# Patient Record
Sex: FEMALE | Race: WHITE | NOT HISPANIC OR LATINO | Employment: OTHER | URBAN - METROPOLITAN AREA
[De-identification: names, ages, dates, MRNs, and addresses within clinical notes are randomized per-mention and may not be internally consistent; named-entity substitution may affect disease eponyms.]

---

## 2023-03-15 ENCOUNTER — HOSPITAL ENCOUNTER (OUTPATIENT)
Dept: RADIATION ONCOLOGY | Facility: HOSPITAL | Age: 83
Setting detail: RADIATION/ONCOLOGY SERIES
Discharge: HOME | End: 2023-03-15
Payer: MEDICARE

## 2023-03-15 ENCOUNTER — OFFICE VISIT (OUTPATIENT)
Dept: RADIATION ONCOLOGY | Facility: HOSPITAL | Age: 83
End: 2023-03-15
Payer: MEDICARE

## 2023-03-15 VITALS
DIASTOLIC BLOOD PRESSURE: 65 MMHG | BODY MASS INDEX: 14.85 KG/M2 | TEMPERATURE: 98.6 F | RESPIRATION RATE: 18 BRPM | HEART RATE: 72 BPM | HEIGHT: 64 IN | SYSTOLIC BLOOD PRESSURE: 136 MMHG | OXYGEN SATURATION: 93 % | WEIGHT: 87 LBS

## 2023-03-15 DIAGNOSIS — C44.91 NODULAR BASAL CELL CARCINOMA: Primary | ICD-10-CM

## 2023-03-15 PROCEDURE — G0463 HOSPITAL OUTPT CLINIC VISIT: HCPCS | Performed by: RADIOLOGY

## 2023-03-15 RX ORDER — LISINOPRIL 20 MG/1
1 TABLET ORAL DAILY
COMMUNITY
Start: 2023-02-18

## 2023-03-15 RX ORDER — ALBUTEROL SULFATE 90 UG/1
2 AEROSOL, METERED RESPIRATORY (INHALATION) EVERY 4 HOURS PRN
COMMUNITY
Start: 2023-03-09

## 2023-03-15 RX ORDER — METOPROLOL SUCCINATE 100 MG/1
TABLET, EXTENDED RELEASE ORAL
COMMUNITY
Start: 2023-02-02

## 2023-03-15 RX ORDER — AMLODIPINE BESYLATE 5 MG/1
1 TABLET ORAL DAILY
COMMUNITY
Start: 2022-12-26

## 2023-03-15 RX ORDER — TIOTROPIUM BROMIDE INHALATION SPRAY 3.12 UG/1
SPRAY, METERED RESPIRATORY (INHALATION)
COMMUNITY
Start: 2023-01-16

## 2023-03-15 RX ORDER — BENZONATATE 200 MG/1
CAPSULE ORAL
COMMUNITY
Start: 2023-01-09

## 2023-03-15 RX ORDER — FLUTICASONE PROPIONATE 50 MCG
SPRAY, SUSPENSION (ML) NASAL
COMMUNITY
Start: 2023-01-09

## 2023-03-15 RX ORDER — DOXYCYCLINE HYCLATE 50 MG/1
1 CAPSULE, GELATIN COATED ORAL
COMMUNITY
Start: 2023-02-18

## 2023-03-15 RX ORDER — LANSOPRAZOLE 15 MG/1
15 CAPSULE, DELAYED RELEASE ORAL
COMMUNITY
Start: 2023-01-10

## 2023-03-15 RX ORDER — ROSUVASTATIN CALCIUM 20 MG/1
1 TABLET, COATED ORAL DAILY
COMMUNITY
Start: 2023-02-16

## 2023-03-15 RX ORDER — APIXABAN 2.5 MG/1
1 TABLET, FILM COATED ORAL EVERY 12 HOURS SCHEDULED
COMMUNITY
Start: 2022-12-19

## 2023-03-15 RX ORDER — FLUTICASONE PROPIONATE AND SALMETEROL 250; 50 UG/1; UG/1
POWDER RESPIRATORY (INHALATION)
COMMUNITY
Start: 2023-01-09

## 2023-03-15 NOTE — PROGRESS NOTES
CONSULTATION NOTE      :                                                          1940  DATE OF CONSULTATION:                       3/15/2023   REQUESTING PHYSICIAN:                   DELGADO Jimenez  REASON FOR CONSULTATION:           Basal cell carcinoma of the left cheek and right nasal ala   Cancer Staging   cT1, cN0, cM0    Thank you for requesting my services in evaluation of this pleasant individual.  I am seeing them in outpatient consultation regarding a diagnosis of basal cell carcinoma of the left cheek and right nasal ala.     BRIEF HISTORY:  The patient is a very pleasant 82 y.o. female  with multiple medical comorbidities including COPD and 66-pack-year smoking, who recently presented with 2 separate skin lesions on her face.  The first being a circular area with ulceration just below her left eye, and the second being a small lesion on the right nasal ala.  She was seen at [facility] and underwent shave biopsies that confirmed both of them to be a nodular basal cell carcinoma.  She is not considered an ideal surgical candidate, and is being referred now for consideration of nonsurgical treatment.  She denies any other symptoms of pain, burning, or bleeding.    Allergy:   Allergies   Allergen Reactions   • Atorvastatin Cough     Muscle pain       Social History:   Social History     Socioeconomic History   • Marital status:    Tobacco Use   • Smoking status: Every Day     Packs/day: 1.00     Years: 66.00     Pack years: 66.00     Types: Cigarettes   • Smokeless tobacco: Never   Substance and Sexual Activity   • Alcohol use: Not Currently   • Drug use: Never   • Sexual activity: Defer       Past Medical History:   Past Medical History:   Diagnosis Date   • A-fib ([facility])    • Arthritis    • COPD (chronic obstructive pulmonary disease) ([facility])    • GERD (gastroesophageal reflux disease)    • History of tuberculosis     as teenage   • Hypertension    • Oxygen dependent    • Skin  "cancer        Family History: family history includes Breast cancer in her sister; Multiple myeloma in her mother; Prostate cancer in her father.     Surgical History:   Past Surgical History:   Procedure Laterality Date   • CHOLECYSTECTOMY     • COLON SURGERY     • FEMUR SURGERY Left     3 pins   • HYSTERECTOMY          Review of Systems:   Review of Systems   Constitutional: Positive for fatigue.   Respiratory: Positive for shortness of breath (with exertion).         Afib  O2 2.5 L N/C continuous   Musculoskeletal: Positive for arthralgias.   All other systems reviewed and are negative.          Objective   VITAL SIGNS:   Vitals:    03/15/23 1424   BP: 136/65   Pulse: 72   Resp: 18   Temp: 98.6 °F (37 °C)   TempSrc: Temporal   SpO2: 93%  Comment: 3L O2   Weight: 39.5 kg (87 lb)   Height: 162.6 cm (64\")        Karnofsky score: 80       Physical Exam:   Physical Exam  Vitals and nursing note reviewed.   Constitutional:       General: She is not in acute distress.     Appearance: She is well-developed.   HENT:      Head: Normocephalic and atraumatic.      Nose:      Comments: 5 mm circular and erythematous lesion on the right nasal ala consistent with basal cell carcinoma.  See image below.  There is a separate 7 x 8 mm, ulcerated lesion just inferior and lateral to the lateral canthus of the left eye consistent with basal cell carcinoma.  The lesion is freely mobile.  There are no palpable facial adenopathy.  Eyes:      Conjunctiva/sclera: Conjunctivae normal.      Pupils: Pupils are equal, round, and reactive to light.   Neck:      Comments: No palpable cervical or supraclavicular adenopathy.  Cardiovascular:      Rate and Rhythm: Normal rate and regular rhythm.      Heart sounds: No murmur heard.    No friction rub.   Pulmonary:      Effort: Pulmonary effort is normal.      Breath sounds: Normal breath sounds. No wheezing.   Abdominal:      General: Bowel sounds are normal. There is no distension.      " Palpations: Abdomen is soft. There is no mass.      Tenderness: There is no abdominal tenderness.   Musculoskeletal:         General: Normal range of motion.      Cervical back: Normal range of motion and neck supple.   Lymphadenopathy:      Cervical: No cervical adenopathy.   Skin:     General: Skin is warm and dry.   Neurological:      Mental Status: She is alert and oriented to person, place, and time.   Psychiatric:         Behavior: Behavior normal.         Thought Content: Thought content normal.         Judgment: Judgment normal.            PATHOLOGY  Shave biopsy: Left malar cheek 2/17/2023:  Nodular and micronodular basal cell carcinoma, ulcerated.  The lesion involves the peripheral and deep margin.  Shave biopsy: Right nasal ala: 2/17/2023:  Nodular basal cell carcinoma, with the lesion involving the peripheral and deep margin       The following portions of the patient's history were reviewed and updated as appropriate: allergies, current medications, past family history, past medical history, past social history, past surgical history and problem list.    Assessment:   Assessment  Mrs. Hunt is an 82-year-old female with multiple medical comorbidities including COPD who presents now with 2 basal cell carcinomas of the face.  She is not an ideal candidate for surgical resection, therefore I am seeing her today for consideration of definitive radiation.  I believe both of these sites are excellent candidates for superficial brachytherapy, and I think we can likely treat him to a dose of 30 Gray in 5 fractions.  She is dependent on her son for transportation, and they expressed some difficulty today with her ability to come back and forth for frequent treatments.  We will therefore tentatively schedule her treatments to begin the next 3 to 4 weeks and we will aim to treat her once per week for total of 5 weeks of the week and schedule them to the family's availability.  They were very pleased with this  treatment option and signed informed consent.  We will be in contact with them to schedule her treatments once they have been able to discuss amongst the family and decide on the most optimal times based on their schedule.  She will return initially for set up session, and then the 5 individual treatments    RECOMMENDATIONS:    Plan for brachytherapy to the right nasal ala and the left malar cheek, 30 Gray in 5 fractions    I spent a total of 45 minutes on todays visit, with more than 30 minutes in direct face to face communication, and the remainder of the time spent in reviewing the relevant history, records, available imaging, and for documentation.    Follow Up:   Return in about 4 weeks (around 4/12/2023) for Brachytherapy Simulation.  Diagnoses and all orders for this visit:    1. Nodular basal cell carcinoma (Primary)  -     Ambulatory Referral to ONC Social Work    Other orders  -     [facility]    Thank you for allowing me to participate in the care of this individual.    Sincerely,       Alpesh Medina MD

## 2023-03-16 ENCOUNTER — TELEPHONE (OUTPATIENT)
Dept: ONCOLOGY | Facility: CLINIC | Age: 83
End: 2023-03-16
Payer: MEDICARE

## 2023-03-16 NOTE — TELEPHONE ENCOUNTER
Distress Screening Follow-up    Name: Corina Hunt    : 1940    Diagnosis: Nodular basal cell carcinoma    Location of Distress Screening: Radiation Oncology    Distress Level: 4 (3/15/2023  2:00 PM)      Comments:     SW called to follow up with pt regarding recent distress screen results. SW left a VM with pt introducing self, and offering additional support. SW provided call back number, ([phone]) and encouraged pt to reach out at her convenience.     SW will remain available to offer support.     Damaris Gaston, MSW  Oncology Social Worker

## 2023-04-19 ENCOUNTER — HOSPITAL ENCOUNTER (OUTPATIENT)
Dept: RADIATION ONCOLOGY | Facility: HOSPITAL | Age: 83
Discharge: HOME | End: 2023-04-19

## 2023-04-19 ENCOUNTER — HOSPITAL ENCOUNTER (OUTPATIENT)
Dept: RADIATION ONCOLOGY | Facility: HOSPITAL | Age: 83
Setting detail: RADIATION/ONCOLOGY SERIES
Discharge: HOME | End: 2023-04-19
Payer: MEDICARE

## 2023-04-19 ENCOUNTER — PROCEDURE VISIT (OUTPATIENT)
Dept: RADIATION ONCOLOGY | Facility: HOSPITAL | Age: 83
End: 2023-04-19
Payer: MEDICARE

## 2023-04-19 DIAGNOSIS — C44.91 NODULAR BASAL CELL CARCINOMA: Primary | ICD-10-CM

## 2023-04-19 PROCEDURE — G0463 HOSPITAL OUTPT CLINIC VISIT: HCPCS

## 2023-04-19 PROCEDURE — 77290 THER RAD SIMULAJ FIELD CPLX: CPT | Performed by: RADIOLOGY

## 2023-04-19 PROCEDURE — 77470 SPECIAL RADIATION TREATMENT: CPT | Performed by: RADIOLOGY

## 2023-04-19 PROCEDURE — 77334 RADIATION TREATMENT AID(S): CPT | Performed by: RADIOLOGY

## 2023-04-20 PROCEDURE — 77316 BRACHYTX ISODOSE PLAN SIMPLE: CPT | Performed by: RADIOLOGY

## 2023-04-20 NOTE — PROGRESS NOTES
RADIATION ONCOLOGY RE-EVALUATION NOTE    :                                                            1940  DATE OF RE-EVALUATION:                       2023   REQUESTING PHYSICIAN:                    DELGADO Jimenez    BRIEF HISTORY: The patient is a very pleasant 82 y.o. female  with multiple medical comorbidities including COPD and 66-pack-year smoking, who recently presented with 2 separate skin lesions on her face.  The first being a circular area with ulceration just below her left eye, and the second being a small lesion on the right nasal ala.  She is not considered an ideal surgical candidate, and is being referred now for consideration of nonsurgical treatment.  She denies any other symptoms of pain, burning, or bleeding.  I last saw her 1 month ago when we discussed options and she was most interested in pursuing brachytherapy, but wanted to wait before she started treatments.  She returns today for reevaluation and brachytherapy planning.    Allergy:         Allergies   Allergen Reactions   • Atorvastatin Cough       Muscle pain         Social History:   Social History   Social History            Socioeconomic History   • Marital status:    Tobacco Use   • Smoking status: Every Day       Packs/day: 1.00       Years: 66.00       Pack years: 66.00       Types: Cigarettes   • Smokeless tobacco: Never   Substance and Sexual Activity   • Alcohol use: Not Currently   • Drug use: Never   • Sexual activity: Defer            Past Medical History:   Medical History        Past Medical History:   Diagnosis Date   • A-fib ([facility])     • Arthritis     • COPD (chronic obstructive pulmonary disease) ([facility])     • GERD (gastroesophageal reflux disease)     • History of tuberculosis       as teenage   • Hypertension     • Oxygen dependent     • Skin cancer              Family History: family history includes Breast cancer in her sister; Multiple myeloma in her mother; Prostate cancer in her father.       Surgical History:   Surgical History         Past Surgical History:   Procedure Laterality Date   • CHOLECYSTECTOMY       • COLON SURGERY       • FEMUR SURGERY Left       3 pins   • HYSTERECTOMY                Review of Systems:   Review of Systems   Constitutional: Positive for fatigue.   Respiratory: Positive for shortness of breath (with exertion).         Afib  O2 2.5 L N/C continuous   Musculoskeletal: Positive for arthralgias.   All other systems reviewed and are negative.     Physical Exam  Vitals and nursing note reviewed.   Constitutional:       General: She is not in acute distress.     Appearance: She is well-developed.   HENT:      Head: Normocephalic and atraumatic.      Comments: 5x7mm Ulcerative lesion on the left maxillary cheek at the zygoma, and small punctate 5mm lesion on the right nasal ala without ulceration  Eyes:      Conjunctiva/sclera: Conjunctivae normal.      Pupils: Pupils are equal, round, and reactive to light.   Cardiovascular:      Rate and Rhythm: Normal rate and regular rhythm.      Heart sounds: No murmur heard.    No friction rub.   Pulmonary:      Effort: Pulmonary effort is normal.      Breath sounds: Normal breath sounds. No wheezing.   Abdominal:      General: Bowel sounds are normal. There is no distension.      Palpations: Abdomen is soft. There is no mass.      Tenderness: There is no abdominal tenderness.   Musculoskeletal:         General: Normal range of motion.      Cervical back: Normal range of motion and neck supple.   Lymphadenopathy:      Cervical: No cervical adenopathy.   Skin:     General: Skin is warm and dry.   Neurological:      Mental Status: She is alert and oriented to person, place, and time.   Psychiatric:         Behavior: Behavior normal.         Thought Content: Thought content normal.         Judgment: Judgment normal.     ASSESSMENT/PLAN:  Ms. Hunt is an 82-year-old female with invasive basal cell carcinoma of the right nasal ala in the left  maxillary cheek.  She prefers nonsurgical therapy.  She is a very good candidate for brachytherapy and I plan to treat her to a dose of 30 Gray in 5 fractions that we will deliver on an every other day basis.  Consent had already been signed so we proceeded with a brachytherapy planning session today.  I anticipate that she will begin treatments in the next 1 to 2 weeks pending insurance authorization.    I spent a total of 45 minutes on today's visit, with over 30 minutes in direct face-to-face communication with the patient.

## 2023-04-24 ENCOUNTER — HOSPITAL ENCOUNTER (OUTPATIENT)
Dept: RADIATION ONCOLOGY | Facility: HOSPITAL | Age: 83
Discharge: HOME | End: 2023-04-24

## 2023-04-24 ENCOUNTER — PROCEDURE VISIT (OUTPATIENT)
Dept: RADIATION ONCOLOGY | Facility: HOSPITAL | Age: 83
End: 2023-04-24
Payer: MEDICARE

## 2023-04-24 DIAGNOSIS — J44.9 CHRONIC OBSTRUCTIVE PULMONARY DISEASE, UNSPECIFIED COPD TYPE: Primary | ICD-10-CM

## 2023-04-24 PROCEDURE — C1717 BRACHYTX, NON-STR,HDR IR-192: HCPCS | Performed by: RADIOLOGY

## 2023-04-24 PROCEDURE — 77280 THER RAD SIMULAJ FIELD SMPL: CPT | Performed by: RADIOLOGY

## 2023-04-24 PROCEDURE — 77768 HDR RDNCL SKN SURF BRACHYTX: CPT | Performed by: RADIOLOGY

## 2023-04-24 RX ORDER — ALBUTEROL SULFATE 90 UG/1
2 AEROSOL, METERED RESPIRATORY (INHALATION)
Qty: 1 G | Refills: 2 | Status: CANCELLED | OUTPATIENT
Start: 2023-04-24

## 2023-04-24 RX ORDER — ALBUTEROL SULFATE 90 UG/1
2 AEROSOL, METERED RESPIRATORY (INHALATION) EVERY 4 HOURS PRN
Qty: 18 G | Refills: 2 | Status: SHIPPED | OUTPATIENT
Start: 2023-04-24

## 2023-04-26 ENCOUNTER — PROCEDURE VISIT (OUTPATIENT)
Dept: RADIATION ONCOLOGY | Facility: HOSPITAL | Age: 83
End: 2023-04-26
Payer: MEDICARE

## 2023-04-26 ENCOUNTER — HOSPITAL ENCOUNTER (OUTPATIENT)
Dept: RADIATION ONCOLOGY | Facility: HOSPITAL | Age: 83
Discharge: HOME | End: 2023-04-26

## 2023-04-26 DIAGNOSIS — C44.91 NODULAR BASAL CELL CARCINOMA: Primary | ICD-10-CM

## 2023-04-26 PROCEDURE — 77280 THER RAD SIMULAJ FIELD SMPL: CPT | Performed by: RADIOLOGY

## 2023-04-26 PROCEDURE — 77768 HDR RDNCL SKN SURF BRACHYTX: CPT | Performed by: RADIOLOGY

## 2023-04-26 PROCEDURE — C1717 BRACHYTX, NON-STR,HDR IR-192: HCPCS | Performed by: RADIOLOGY

## 2023-04-26 NOTE — PROGRESS NOTES
04/26/2023  Corina Hunt    Procedure: Skin Brachytherapy  Diagnosis: Non-melanoma skin cancer    HDR#   2 out of a planned total of 5 treatments.    Patient presents for skin brachytherapy to 2 separate areas - the left cheek and the right nasal ala..  They have no complaints.  The patient was positioned in the supine position on the treatment table.  A simple simulation was performed today confirming the correct size, position, and placement of accessory devices to hold the skin applicator.      Left Maxillary Cheek  The 2 cm Rivera applicator was placed using the treatment cap and centered over the lesion on the leftt cheek.  Our Medical Physicist performed pretreatment surveys, calibrated the treatment plan based on today's dose rate, and confirmed correct placement and connection of the applicator to the HDR afterloader.  A dose of 6 Gy was prescribed to a depth of 3mm.       Right Nasal Ala  The 2 cm Rivera applicator was placed using the treatment cap and centered over the lesion on the right nasal ala.  Our Medical Physicist performed pretreatment surveys, calibrated the treatment plan based on today's dose rate, and confirmed correct placement and connection of the applicator to the HDR afterloader.  A dose of 6 Gy was prescribed to a depth of 3mm.      The Medical Physicist and the Radiation Oncologist remained present at the console for the entire duration of brachytherapy administration.  There were no immediate events, and the patient tolerated the procedure well with no complications.  Physics survey was negative with no residual radioactivity.  The patient was then discharged to home in good condition.  Patient will return for their next treatment.

## 2023-05-01 ENCOUNTER — HOSPITAL ENCOUNTER (OUTPATIENT)
Dept: RADIATION ONCOLOGY | Facility: HOSPITAL | Age: 83
Setting detail: RADIATION/ONCOLOGY SERIES
Discharge: HOME | End: 2023-05-01
Payer: MEDICARE

## 2023-05-01 ENCOUNTER — PROCEDURE VISIT (OUTPATIENT)
Dept: RADIATION ONCOLOGY | Facility: HOSPITAL | Age: 83
End: 2023-05-01
Payer: MEDICARE

## 2023-05-01 DIAGNOSIS — C44.91 NODULAR BASAL CELL CARCINOMA: Primary | ICD-10-CM

## 2023-05-01 PROCEDURE — C1717 BRACHYTX, NON-STR,HDR IR-192: HCPCS | Performed by: RADIOLOGY

## 2023-05-01 PROCEDURE — 77280 THER RAD SIMULAJ FIELD SMPL: CPT | Performed by: RADIOLOGY

## 2023-05-01 PROCEDURE — 77768 HDR RDNCL SKN SURF BRACHYTX: CPT | Performed by: RADIOLOGY

## 2023-05-01 NOTE — PROGRESS NOTES
05/1/2023  Corina Hunt     Procedure: Skin Brachytherapy  Diagnosis: Non-melanoma skin cancer     HDR#   3 out of a planned total of 5 treatments.     Patient presents for skin brachytherapy to 2 separate areas - the left cheek and the right nasal ala..  They have no complaints.  The patient was positioned in the supine position on the treatment table.  A simple simulation was performed today confirming the correct size, position, and placement of accessory devices to hold the skin applicator.       Left Maxillary Cheek  The 2 cm Rivera applicator was placed using the treatment cap and centered over the lesion on the leftt cheek.  Our Medical Physicist performed pretreatment surveys, calibrated the treatment plan based on today's dose rate, and confirmed correct placement and connection of the applicator to the HDR afterloader.  A dose of 6 Gy was prescribed to a depth of 3mm.       Right Nasal Ala  The 2 cm Rivera applicator was placed using the treatment cap and centered over the lesion on the right nasal ala.  Our Medical Physicist performed pretreatment surveys, calibrated the treatment plan based on today's dose rate, and confirmed correct placement and connection of the applicator to the HDR afterloader.  A dose of 6 Gy was prescribed to a depth of 3mm.      The Medical Physicist and the Radiation Oncologist remained present at the console for the entire duration of brachytherapy administration.  There were no immediate events, and the patient tolerated the procedure well with no complications.  Physics survey was negative with no residual radioactivity.  The patient was then discharged to home in good condition.  Patient will return for their next treatment.

## 2023-05-03 ENCOUNTER — HOSPITAL ENCOUNTER (OUTPATIENT)
Dept: RADIATION ONCOLOGY | Facility: HOSPITAL | Age: 83
Setting detail: RADIATION/ONCOLOGY SERIES
Discharge: HOME | End: 2023-05-03
Payer: MEDICARE

## 2023-05-03 ENCOUNTER — PROCEDURE VISIT (OUTPATIENT)
Dept: RADIATION ONCOLOGY | Facility: HOSPITAL | Age: 83
End: 2023-05-03
Payer: MEDICARE

## 2023-05-03 DIAGNOSIS — C44.91 NODULAR BASAL CELL CARCINOMA: Primary | ICD-10-CM

## 2023-05-03 PROCEDURE — 77768 HDR RDNCL SKN SURF BRACHYTX: CPT | Performed by: RADIOLOGY

## 2023-05-03 PROCEDURE — C1717 BRACHYTX, NON-STR,HDR IR-192: HCPCS | Performed by: RADIOLOGY

## 2023-05-03 PROCEDURE — 77280 THER RAD SIMULAJ FIELD SMPL: CPT | Performed by: RADIOLOGY

## 2023-05-03 NOTE — PROGRESS NOTES
05/3/2023  Corina Ferrisley     Procedure: Skin Brachytherapy  Diagnosis: Non-melanoma skin cancer     HDR#   4 out of a planned total of 5 treatments.     Patient presents for skin brachytherapy to 2 separate areas - the left cheek and the right nasal ala..  They have no complaints.  The patient was positioned in the supine position on the treatment table.  A simple simulation was performed today confirming the correct size, position, and placement of accessory devices to hold the skin applicator.       Left Maxillary Cheek  The 2 cm Rivera applicator was placed using the treatment cap and centered over the lesion on the leftt cheek.  Our Medical Physicist performed pretreatment surveys, calibrated the treatment plan based on today's dose rate, and confirmed correct placement and connection of the applicator to the HDR afterloader.  A dose of 6 Gy was prescribed to a depth of 3mm.       Right Nasal Ala  The 2 cm Rivera applicator was placed using the treatment cap and centered over the lesion on the right nasal ala.  Our Medical Physicist performed pretreatment surveys, calibrated the treatment plan based on today's dose rate, and confirmed correct placement and connection of the applicator to the HDR afterloader.  A dose of 6 Gy was prescribed to a depth of 3mm.      The Medical Physicist and the Radiation Oncologist remained present at the console for the entire duration of brachytherapy administration.  There were no immediate events, and the patient tolerated the procedure well with no complications.  Physics survey was negative with no residual radioactivity.  The patient was then discharged to home in good condition.  Patient will return for their next treatment

## 2023-05-08 ENCOUNTER — APPOINTMENT (OUTPATIENT)
Dept: RADIATION ONCOLOGY | Facility: HOSPITAL | Age: 83
End: 2023-05-08
Payer: MEDICARE

## 2023-05-08 ENCOUNTER — HOSPITAL ENCOUNTER (OUTPATIENT)
Dept: RADIATION ONCOLOGY | Facility: HOSPITAL | Age: 83
Setting detail: RADIATION/ONCOLOGY SERIES
Discharge: HOME | End: 2023-05-08
Payer: MEDICARE

## 2023-05-08 DIAGNOSIS — C44.91 NODULAR BASAL CELL CARCINOMA: Primary | ICD-10-CM

## 2023-05-08 PROCEDURE — C1717 BRACHYTX, NON-STR,HDR IR-192: HCPCS | Performed by: RADIOLOGY

## 2023-05-08 PROCEDURE — 77768 HDR RDNCL SKN SURF BRACHYTX: CPT | Performed by: RADIOLOGY

## 2023-05-08 PROCEDURE — 77336 RADIATION PHYSICS CONSULT: CPT | Performed by: RADIOLOGY

## 2023-05-08 PROCEDURE — 77280 THER RAD SIMULAJ FIELD SMPL: CPT | Performed by: RADIOLOGY

## 2023-05-18 NOTE — RADIATION COMPLETION NOTES
DATE OF COMPLETION: 5/8/2023  DIAGNOSIS: Cutaneous basal cell carcinomas of the face   Cancer Staging   cT1, cN0, cM0    REFERRING: Dajuan Mensah MD        Corina Vera completed radiation therapy today.      BACKGROUND: Corina Hunt is an 82-year-old female with multiple cutaneous basal cell carcinomas.  She desires nonsurgical therapy and has an aggressive lesion on the right nasal ala and the left maxillary cheek.  She underwent a course of superficial brachytherapy and completed treatments as detailed below:    Treatment Summary     Dates of Therapy: 4/20/2023-5/8/2020  Treatment Site: Left maxillary cheek  Dose: 30 Gy in 5 fractions of 6 Gy each  Technique: Treatments were delivered using a 2 cm Rivera applicator using iridium 192 sources with calculations designed to deliver the prescription dose to a depth of 3 mm.    Dates of Therapy: 4/20/2023-5/8/2020  Treatment Site: Right nasal ala  Dose: 30 Gy in 5 fractions of 6 Gy each  Technique: Treatments were delivered using a 2 cm Rivera applicator using iridium 192 sources with calculations designed to deliver the prescription dose to a depth of 3 mm.    Treatment Course and Tolerance: She tolerated radiation treatments very well.  She developed only mild cutaneous skin changes consistent with grade 1 erythema.    The initial follow up visit will be in 1 month.    Corina Hunt knows to call if any problems or concerns develop in the meantime.     Electronically signed by: Alpesh Medina MD                    Cc: Ambar Oakley MD

## 2023-06-14 ENCOUNTER — OFFICE VISIT (OUTPATIENT)
Dept: RADIATION ONCOLOGY | Facility: HOSPITAL | Age: 83
End: 2023-06-14
Payer: MEDICARE

## 2023-06-14 VITALS
SYSTOLIC BLOOD PRESSURE: 117 MMHG | WEIGHT: 83 LBS | BODY MASS INDEX: 14.25 KG/M2 | OXYGEN SATURATION: 92 % | TEMPERATURE: 97.3 F | RESPIRATION RATE: 18 BRPM | HEART RATE: 77 BPM | DIASTOLIC BLOOD PRESSURE: 61 MMHG

## 2023-06-14 DIAGNOSIS — C44.91 NODULAR BASAL CELL CARCINOMA: Primary | ICD-10-CM

## 2023-06-14 PROCEDURE — G0463 HOSPITAL OUTPT CLINIC VISIT: HCPCS

## 2023-06-14 RX ORDER — BUDESONIDE, GLYCOPYRROLATE, AND FORMOTEROL FUMARATE 160; 9; 4.8 UG/1; UG/1; UG/1
2 AEROSOL, METERED RESPIRATORY (INHALATION) 2 TIMES DAILY
COMMUNITY

## 2023-06-14 NOTE — PROGRESS NOTES
FOLLOW UP NOTE    PATIENT:                                                      Corina Hunt  MEDICAL RECORD #:                        [id number]  :                                                          1940  COMPLETION DATE:    2023  DIAGNOSIS:     Nodular basal cell carcinoma  - cT1, cN0, cM0    BRIEF HISTORY:  Mrs. Hunt returns to clinic today for a scheduled follow-up visit after completing brachytherapy along to 2 separate cutaneous basal cell carcinomas on her face, including the left lateral orbit and the right nasal ala. She is very pleased.  She has tolerated radiation well and does not have any ongoing symptoms.  Both lesions seem to have resolved per her report.    MEDICATIONS: Medication reconciliation for the patient was reviewed and confirmed in the electronic medical record.    Review of Systems   Respiratory:  Positive for shortness of breath.         3L O2 continous   All other systems reviewed and are negative.    Karnofsky score: 80     Physical Exam  Vitals and nursing note reviewed.   Constitutional:       General: She is not in acute distress.     Appearance: She is well-developed.   HENT:      Head: Normocephalic and atraumatic.      Comments: Minimal postradiation changes over the right nasal ala and the left lateral orbit.  There is only mild erythema consistent with treatment response and no evidence of ulceration or active disease.  Eyes:      Conjunctiva/sclera: Conjunctivae normal.      Pupils: Pupils are equal, round, and reactive to light.   Cardiovascular:      Rate and Rhythm: Normal rate and regular rhythm.      Heart sounds: No murmur heard.    No friction rub.   Pulmonary:      Effort: Pulmonary effort is normal.      Breath sounds: Normal breath sounds. No wheezing.   Abdominal:      General: Bowel sounds are normal. There is no distension.      Palpations: Abdomen is soft. There is no mass.      Tenderness: There is no abdominal tenderness.    Musculoskeletal:         General: Normal range of motion.      Cervical back: Normal range of motion and neck supple.   Lymphadenopathy:      Cervical: No cervical adenopathy.   Skin:     General: Skin is warm and dry.   Neurological:      Mental Status: She is alert and oriented to person, place, and time.   Psychiatric:         Behavior: Behavior normal.         Thought Content: Thought content normal.         Judgment: Judgment normal.         VITAL SIGNS:   Vitals:    06/14/23 1539   BP: 117/61   Pulse: 77   Resp: 18   Temp: 97.3 °F (36.3 °C)   TempSrc: Temporal   SpO2: 92%  Comment: 3L O2   Weight: 37.6 kg (83 lb)             Karnofsky score: 80         The following portions of the patient's history were reviewed and updated as appropriate: allergies, current medications, past family history, past medical history, past social history, past surgical history and problem list.         Diagnoses and all orders for this visit:    1. Nodular basal cell carcinoma (Primary)         IMPRESSION: Mrs. Hunt is an 82-year-old female with multiple medical comorbidities of 2 separate basal cell carcinomas involving the right nasal ala and the left inferior lateral orbit.  She desired nonsurgical therapy and was treated with superficial brachytherapy alone.  She completed treatment 3 weeks ago and clinically is doing very well.  She has had a very nice early response at the both sites, and has no active disease at this time.  I discussed with her the long-term survivorship model including the timeframe for repeat exams and surveillance.  She is already scheduled to be seen again by her dermatologist.  She can be seen back in my clinic on an as-needed basis.    RECOMMENDATIONS: Follow-up care per Dermatology    I spent a total of 30 minutes on todays visit, with more than 20 minutes in direct face to face communication, and the remainder of the time spent in reviewing the relevant history, records, available imaging, and  for documentation.    Return Follow-up care per dermatology.    Alpesh Medina MD